# Patient Record
Sex: MALE | Race: BLACK OR AFRICAN AMERICAN | NOT HISPANIC OR LATINO | Employment: STUDENT | URBAN - METROPOLITAN AREA
[De-identification: names, ages, dates, MRNs, and addresses within clinical notes are randomized per-mention and may not be internally consistent; named-entity substitution may affect disease eponyms.]

---
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
## 2023-01-01 ENCOUNTER — OFFICE VISIT (OUTPATIENT)
Dept: PEDIATRIC CARDIOLOGY | Facility: CLINIC | Age: 23
End: 2023-01-01
Attending: PEDIATRICS
Payer: COMMERCIAL

## 2023-01-01 ENCOUNTER — HOSPITAL ENCOUNTER (OUTPATIENT)
Dept: CARDIOLOGY | Facility: CLINIC | Age: 23
Discharge: HOME | End: 2023-07-28
Attending: PEDIATRICS
Payer: COMMERCIAL

## 2023-01-01 ENCOUNTER — TELEPHONE (OUTPATIENT)
Dept: CARDIOLOGY | Facility: CLINIC | Age: 23
End: 2023-01-01
Payer: COMMERCIAL

## 2023-01-01 VITALS
OXYGEN SATURATION: 96 % | DIASTOLIC BLOOD PRESSURE: 70 MMHG | SYSTOLIC BLOOD PRESSURE: 127 MMHG | WEIGHT: 138.67 LBS | HEIGHT: 67 IN | BODY MASS INDEX: 21.76 KG/M2 | RESPIRATION RATE: 16 BRPM | HEART RATE: 62 BPM

## 2023-01-01 DIAGNOSIS — Q24.9 ADULT CONGENITAL HEART DISEASE: ICD-10-CM

## 2023-01-01 DIAGNOSIS — I51.7 LVH (LEFT VENTRICULAR HYPERTROPHY): ICD-10-CM

## 2023-01-01 DIAGNOSIS — I42.2 HYPERTROPHIC CARDIOMYOPATHY (H): ICD-10-CM

## 2023-01-01 DIAGNOSIS — I51.7 LVH (LEFT VENTRICULAR HYPERTROPHY): Primary | ICD-10-CM

## 2023-01-01 DIAGNOSIS — Z82.41 FAMILY HISTORY OF SUDDEN CARDIAC DEATH: Primary | ICD-10-CM

## 2023-01-01 DIAGNOSIS — I42.2 HYPERTROPHIC CARDIOMYOPATHY (H): Primary | ICD-10-CM

## 2023-01-01 DIAGNOSIS — Z82.41 FAMILY HISTORY OF SUDDEN CARDIAC DEATH: ICD-10-CM

## 2023-01-01 DIAGNOSIS — I42.8 OTHER CARDIOMYOPATHY (H): ICD-10-CM

## 2023-01-01 PROCEDURE — G0463 HOSPITAL OUTPT CLINIC VISIT: HCPCS | Mod: 25 | Performed by: PEDIATRICS

## 2023-01-01 PROCEDURE — 93306 TTE W/DOPPLER COMPLETE: CPT | Mod: 26 | Performed by: PEDIATRICS

## 2023-01-01 PROCEDURE — 93303 ECHO TRANSTHORACIC: CPT

## 2023-01-01 PROCEDURE — 93325 DOPPLER ECHO COLOR FLOW MAPG: CPT

## 2023-01-01 PROCEDURE — 99213 OFFICE O/P EST LOW 20 MIN: CPT | Mod: 25 | Performed by: PEDIATRICS

## 2023-01-01 ASSESSMENT — PATIENT HEALTH QUESTIONNAIRE - PHQ9: SUM OF ALL RESPONSES TO PHQ QUESTIONS 1-9: 8

## 2023-03-07 NOTE — OR NURSING
Patient came to ED with c/o pain in the left lower back that goes into her hip and down into her leg. When asking the patient if it was like a sciatic pain, pt states that it is more like kidney stone pain and rates it \"12/20\". Pt states she has a history of kidney stones. Patient refused to wear a mask on admission.  Mother convinced him to wear a mask but patient would not cover his nose - mother stated he could not breathe with mask over nose.  Explained the rationale for masking and expectation that if any staff were in the room, mask must be worn properly.  Patient upset and walked out of preop room.  Nurse manager explained rules/rationale for masking to mother and girlfriend.  Patient and family returned to preop.  Patient would not answer any questions. No eye contact from patient.  Patient on phone thru out interviews.  All questions answered by mother or girlfriend.  Overview of day explained to patient and family.. Will sign consent when MD comes to see preop.

## 2023-05-31 NOTE — TELEPHONE ENCOUNTER
Health Call Center    Phone Message    May a detailed message be left on voicemail: yes     Reason for Call: Other: New Hypertrophic Cardiomyopathy patient, would like to be seen in [place]. Please review and contact mother to schedule.     Action Taken: Other: cardiology    Travel Screening: Not Applicable   Thank you!  [facility]

## 2023-06-01 NOTE — TELEPHONE ENCOUNTER
Date: 6/1/2023    Time of Call: 9:03 AM     Diagnosis: HCM     [ TORB ] Ordering provider: Dr. Milligan  Order: cardiac MRI, zio, labs, ekg, appt     Order received by: TONYA Pierson     Follow-up/additional notes: Overdue referral from peds. Former Dr. Geller pt. Orders placed, sent to scheduling.  Kenna Mccain RN on 6/1/2023 at 9:03 AM

## 2023-06-08 NOTE — TELEPHONE ENCOUNTER
Spoke with mother, patient has ankle swelling and has not been taking his medications per mom. She would schedule with Dr Milligan in October if she is able to see someone else in the mean time sooner. Reaching out to Dr Geller team to see if he can be seen sooner with him and then transfer his care to the adult side.

## 2023-06-08 NOTE — TELEPHONE ENCOUNTER
Called pt mother back in regards to scheduling pt with [facility] with zio, MRI and labs prior. pts mother would pt to be seen sooner that Oct ([facility]ve next available) told pts mom we could sent out zio patch now for pt and if anything comes up urgent we will notify them and get him in sooner, pts mother refused and mentioned wanting to have her son see a different provider.

## 2023-06-08 NOTE — TELEPHONE ENCOUNTER
[facility] Call Center    Phone Message    May a detailed message be left on voicemail: yes     Reason for Call: Other: Please return call to assist mother in scheduling for the pt.      Action Taken: Other: Cardiology    Travel Screening: Not Applicable     Thank you!  Special[facility] Access Center

## 2023-07-28 NOTE — PROGRESS NOTES
"                                              PEDS Cardiac Letter  Date: 2023      Krissy Parmar MD  [address] [id number]      PATIENT: Alexys Medrano  :         2000   BARRON:         2023    Dear Dr Parmar:    Alexys is 23 years old and was seen at the [facility] on 2023. He is seen for hypertrophic cardiomyopathy.  In the past he had dynamic left ventricular outflow obstruction and underwent an extensive myectomy on 2021.  He no longer takes metoprolol.  He says that his exercise tolerance seems normal although his legs become tired when he stands for long period of time.  He is planning to enroll at [employer] to finish his senior year of high school.  He has not experienced any syncope, palpitations or chest pain.    On physical examination his height was 1.705 m (5' 7.13\") and his weight was 62.9 kg (138 lb 10.7 oz). His heart rate was 62 and respirations 16 per minute. The blood pressure in his right arm was 127/70. He was acyanotic, warm and well perfused. He was alert, cooperative, and in no distress. His lungs were clear to auscultation without respiratory distress. He had a regular rhythm with no murmur. The second heart sound was physiologically split with a normal pulmonary component. There was no organomegaly or abdominal tenderness. Peripheral pulses were 2+ and equal in all extremities. There was no clubbing or edema.    Electrocardiogram performed today that I personally reviewed showed sinus rhythm with a mild interventricular conduction abnormality and associated T wave changes.  An echocardiogram performed today that I personally reviewed showed left ventricular hypertrophy with no left ventricular outflow obstruction.  There was trivial mitral regurgitation and no aortic insufficiency.  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    Alexys has " hypertrophic cardiomyopathy with no outflow obstruction.  He has not experienced any palpitations or syncope, and I elected not to do an ECG monitor today.  He should be seen in follow-up in 1 year with an electrocardiogram and echocardiogram, and I gave him the name of Dr. Mason Salvador who sees adults with congenital heart disease.  He did ask for a prescription for medical marijuana because of some nonspecific chest pains that he had been having, and I suggested that he approach his primary physician.    Thank you very much for your confidence in allowing me to participate in Alexys's care. If you have any questions or concerns, please don't hesitate to contact me.    Sincerely,      Marcial Geller M.D.   Pediatric Cardiology   [facility]  Pediatric Specialty Clinic  [phone]    Note: Chart documentation done in part with Dragon Voice Recognition software. Although reviewed after completion, some word and grammatical errors may remain.

## 2023-07-28 NOTE — LETTER
"2023      RE: Alexys Medrano  [address] [facility] [address]     Dear Colleague,    Thank you for the opportunity to participate in the care of your patient, Alexys Medrano, at the [facility] at [facility]. Please see a copy of my visit note below.                                                  PEDS Cardiac Letter  Date: 2023      Krissy Parmar MD  [address] [place] [address]      PATIENT: Alexys Medrano  :         2000   BARRON:         2023    Dear Dr Parmar:    Alexys is 23 years old and was seen at the [facility] on 2023. He is seen for hypertrophic cardiomyopathy.  In the past he had dynamic left ventricular outflow obstruction and underwent an extensive myectomy on 2021.  He no longer takes metoprolol.  He says that his exercise tolerance seems normal although his legs become tired when he stands for long period of time.  He is planning to enroll at the [place] [employer] to finish his senior year of high school.  He has not experienced any syncope, palpitations or chest pain.    On physical examination his height was 1.705 m (5' 7.13\") and his weight was 62.9 kg (138 lb 10.7 oz). His heart rate was 62 and respirations 16 per minute. The blood pressure in his right arm was 127/70. He was acyanotic, warm and well perfused. He was alert, cooperative, and in no distress. His lungs were clear to auscultation without respiratory distress. He had a regular rhythm with no murmur. The second heart sound was physiologically split with a normal pulmonary component. There was no organomegaly or abdominal tenderness. Peripheral pulses were 2+ and equal in all extremities. There was no clubbing or edema.    Electrocardiogram performed today that I personally reviewed showed sinus rhythm with a mild interventricular " conduction abnormality and associated T wave changes.  An echocardiogram performed today that I personally reviewed showed left ventricular hypertrophy with no left ventricular outflow obstruction.  There was trivial mitral regurgitation and no aortic insufficiency.  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    Alexys has hypertrophic cardiomyopathy with no outflow obstruction.  He has not experienced any palpitations or syncope, and I elected not to do an ECG monitor today.  He should be seen in follow-up in 1 year with an electrocardiogram and echocardiogram, and I gave him the name of Dr. Mason Salvador who sees adults with congenital heart disease.  He did ask for a prescription for medical marijuana because of some nonspecific chest pains that he had been having, and I suggested that he approach his primary physician.    Thank you very much for your confidence in allowing me to participate in Alexys's care. If you have any questions or concerns, please don't hesitate to contact me.    Sincerely,      Marcial Geller M.D.   Pediatric Cardiology   [facility]  Pediatric Specialty Clinic  [phone]    Note: Chart documentation done in part with Dragon Voice Recognition software. Although reviewed after completion, some word and grammatical errors may remain.         Please do not hesitate to contact me if you have any questions/concerns.     Sincerely,       Marcial Geller MD

## 2023-07-28 NOTE — PATIENT INSTRUCTIONS
[facility]  [address] [facility] [address] [facility]  [address]  [facility] [id number] [phone]      Cardiology Clinic   RN Care Coordinators: Юлия Jamison, Sharad London or Kendal Viveros  [phone]  Pediatric Cardiology Scheduling  [phone]    Pediatric Call Center/ General Scheduling  [phone]    After Hours and Emergency Contact Number  [phone]  * Ask for the pediatric cardiologist on call         Prescription Renewals  The pharmacy must fax requests to [phone]  * Please allow 3-4 days for prescriptions to be authorized     Imaging Scheduling for Peds Cardiology  [phone]  SHE WILL REACH OUT TO YOU TO SCHEDULE ANY IMAGING NEEDS THAT WERE ORDERED.    Your feedback is very important to us. If you receive a survey about your visit today, please take the time to fill this out so we can continue to improve.

## 2023-07-31 NOTE — PROGRESS NOTES
Date: 7/31/2023    Time of Call: 3:23 PM     Diagnosis:  HCM     [ TORB ] Ordering provider: Madhav Milligan MD  Order: Alexys has hypertrophic cardiomyopathy with no outflow obstruction.  He has not experienced any palpitations or syncope, and I elected not to do an ECG monitor today.  He should be seen in follow-up in 1 year with an electrocardiogram and echocardiogram, and I gave him the name of Dr. Mason Salvador who sees adults with congenital heart disease.      Order received by: Niurka Martinez RN     Follow-up/additional notes: referral from Dr Geller, Dr Salvador does not see HCM in our adult genetics clinic, orders placed for Dr Suárez

## 2023-09-15 LAB
ATRIAL RATE - MUSE: 60 BPM
DIASTOLIC BLOOD PRESSURE - MUSE: NORMAL MMHG
INTERPRETATION ECG - MUSE: NORMAL
P AXIS - MUSE: 42 DEGREES
PR INTERVAL - MUSE: 112 MS
QRS DURATION - MUSE: 116 MS
QT - MUSE: 398 MS
QTC - MUSE: 398 MS
R AXIS - MUSE: 54 DEGREES
SYSTOLIC BLOOD PRESSURE - MUSE: NORMAL MMHG
T AXIS - MUSE: 8 DEGREES
VENTRICULAR RATE- MUSE: 60 BPM

## (undated) DEVICE — SU PROLENE 4-0 RB-1 DA 4X36" M8557

## (undated) DEVICE — ESU ELEC BLADE 2.75" COATED/INSULATED E1455

## (undated) DEVICE — 4FR X 90CM TEMPO ANGIOGRAPHIC CATHETER, PIGTAIL, 0.035IN MAX GUIDEWIRE (EA/1)

## (undated) DEVICE — SU VICRYL 2-0 CT-1 27" UND J259H

## (undated) DEVICE — GLOVE GAMMEX NEOPRENE ULTRA SZ 7 LF 8514

## (undated) DEVICE — BLADE SAW STERNAL 20X30MM KM-32

## (undated) DEVICE — SU SILK 2-0 SH CR 5X18" C0125

## (undated) DEVICE — DRAPE SLUSH/WARMER 66X44" ORS-320

## (undated) DEVICE — DRSG PRIMAPORE 02X3" 7133

## (undated) DEVICE — SU PROLENE 5-0 BBDA 36"  8580H

## (undated) DEVICE — SUCTION MANIFOLD DORNOCH ULTRA CART UL-CL500

## (undated) DEVICE — GOWN IMPERVIOUS 4XL DYNJP2309P

## (undated) DEVICE — CATH ANGIO INFINITI JL3.5 4FRX100CM 538418

## (undated) DEVICE — DRSG TEGADERM 4X10" 1627

## (undated) DEVICE — BLADE KNIFE SURG 15 371115

## (undated) DEVICE — MANIFOLD CUSTOM 2 VALVE H7496021017141

## (undated) DEVICE — SU MONOCRYL 3-0 PS-1 27" Y936H

## (undated) DEVICE — LINEN TOWEL PACK X6 WHITE 5487

## (undated) DEVICE — SU VICRYL 1 CTX 36" J371H

## (undated) DEVICE — SU PLEDGET SOFT TFE 3/8"X3/26"X1/16" PCP40

## (undated) DEVICE — GLIDEWIRE TERUMO RADIOFOCUS .035X260CM ANG EXCHANGE GR3509

## (undated) DEVICE — LINEN DRAPE 54X72" 5467

## (undated) DEVICE — SU SILK 0 CT-1 CR 8X18" C021D

## (undated) DEVICE — SU VICRYL 0 CT-1 CR 8X18" J740D

## (undated) DEVICE — TUBING PRESSURE TRANSDUCER MALE TO FEMALE LL 72" 50P172

## (undated) DEVICE — SU VICRYL 0 CT-1 27" UND J260H

## (undated) DEVICE — INTRODUCER SHEATH 4FRX40CM MICROPUNC PED G47946

## (undated) DEVICE — LINEN TOWEL PACK X30 5481

## (undated) DEVICE — DRSG STERI STRIP 1/2X4" R1547

## (undated) DEVICE — GOWN LG DISP 9515

## (undated) DEVICE — SOL WATER IRRIG 1000ML BOTTLE 2F7114

## (undated) DEVICE — SHEATH INTRODUCER PRELUDE IDEAL 4FR X 11CM  HYDROPHILIC  ANG

## (undated) DEVICE — Device

## (undated) DEVICE — LEAD ELEC MYOCARDIO PACING TEMPORARY 2-0 RB-1 24" TPW10

## (undated) DEVICE — CATH ANGIO INFINITI JR3.5 4FRX100CM 538419

## (undated) DEVICE — MANIFOLD KIT ANGIO AUTOMATED 014613

## (undated) DEVICE — DRAPE IOBAN INCISE 36X23" 6651EZ

## (undated) DEVICE — PACK ADULT HEART UMMC PV15CG92D

## (undated) DEVICE — WIPE PAMPERS PREMOIST CLEANSING BABY SENSITIVE 17116

## (undated) DEVICE — SU PROLENE 3-0 SHDA 48" 8534H

## (undated) DEVICE — CONNECTOR Y 3/8" 369

## (undated) DEVICE — SU PROLENE 4-0 SHDA 36" 8521H

## (undated) DEVICE — SU ETHIBOND 5 V-37 4X30" MB66G

## (undated) DEVICE — SU STEEL 6 CCS 4X18" M654G

## (undated) DEVICE — DRSG TEGADERM 4X4 3/4" 1626W

## (undated) DEVICE — 6FR X 11CM PRELUDE IDEAL HYDROPHILIC SHEATH INTRODUCER

## (undated) DEVICE — DRSG TELFA 3X8" 1238

## (undated) DEVICE — SOL NACL 0.9% IRRIG 1000ML BOTTLE 2F7124

## (undated) DEVICE — ESU PENCIL W/HOLSTER E2350H

## (undated) DEVICE — SYR 10ML LL W/O NDL 302995

## (undated) DEVICE — NDL SPINAL 20GA 3.5" 405182

## (undated) DEVICE — KIT HAND CONTROL ANGIOTOUCH ACIST 65CM AT-P65

## (undated) DEVICE — STRAP KNEE/BODY 31143004

## (undated) DEVICE — SPONGE SURGIFOAM 100 1974

## (undated) DEVICE — SU PROLENE 5-0 C-1DA 36" 8720H

## (undated) DEVICE — PROTECTOR ARM ONE-STEP TRENDELENBURG 40418

## (undated) DEVICE — SU PROLENE 3-0 RB-1DA 36"  8558H

## (undated) DEVICE — SU PROLENE 4-0 BBDA 36" 8581

## (undated) DEVICE — CATH ANGIO WEDGE PRESSURE 6FRX110CM DL AI-07126

## (undated) DEVICE — BLADE KNIFE SURG 10 371110

## (undated) DEVICE — SU SILK 1 TIE 6X30" A307H

## (undated) DEVICE — CLOSURE SYS SKIN PREMIERPRO EXOFINFUSION 4X60CM 3473

## (undated) DEVICE — PACK PEDS LEFT HEART CUSTOM SCV15OHRMH

## (undated) RX ORDER — ACETAMINOPHEN 325 MG/1
TABLET ORAL
Status: DISPENSED
Start: 2020-12-09

## (undated) RX ORDER — LIDOCAINE HYDROCHLORIDE 20 MG/ML
INJECTION, SOLUTION EPIDURAL; INFILTRATION; INTRACAUDAL; PERINEURAL
Status: DISPENSED
Start: 2021-01-20

## (undated) RX ORDER — HEPARIN SODIUM 1000 [USP'U]/ML
INJECTION, SOLUTION INTRAVENOUS; SUBCUTANEOUS
Status: DISPENSED
Start: 2021-01-20

## (undated) RX ORDER — HEPARIN SODIUM 1000 [USP'U]/ML
INJECTION, SOLUTION INTRAVENOUS; SUBCUTANEOUS
Status: DISPENSED
Start: 2020-12-09

## (undated) RX ORDER — LIDOCAINE HYDROCHLORIDE 10 MG/ML
INJECTION, SOLUTION EPIDURAL; INFILTRATION; INTRACAUDAL; PERINEURAL
Status: DISPENSED
Start: 2020-12-09

## (undated) RX ORDER — FENTANYL CITRATE-0.9 % NACL/PF 10 MCG/ML
PLASTIC BAG, INJECTION (ML) INTRAVENOUS
Status: DISPENSED
Start: 2021-01-20

## (undated) RX ORDER — BUPIVACAINE HYDROCHLORIDE 2.5 MG/ML
INJECTION, SOLUTION EPIDURAL; INFILTRATION; INTRACAUDAL
Status: DISPENSED
Start: 2020-12-09

## (undated) RX ORDER — PROPOFOL 10 MG/ML
INJECTION, EMULSION INTRAVENOUS
Status: DISPENSED
Start: 2021-01-20

## (undated) RX ORDER — FENTANYL CITRATE 50 UG/ML
INJECTION, SOLUTION INTRAMUSCULAR; INTRAVENOUS
Status: DISPENSED
Start: 2021-01-20

## (undated) RX ORDER — CEFAZOLIN SODIUM 1 G/3ML
INJECTION, POWDER, FOR SOLUTION INTRAMUSCULAR; INTRAVENOUS
Status: DISPENSED
Start: 2021-01-20

## (undated) RX ORDER — ONDANSETRON 2 MG/ML
INJECTION INTRAMUSCULAR; INTRAVENOUS
Status: DISPENSED
Start: 2021-01-20

## (undated) RX ORDER — FENTANYL CITRATE 50 UG/ML
INJECTION, SOLUTION INTRAMUSCULAR; INTRAVENOUS
Status: DISPENSED
Start: 2020-12-09

## (undated) RX ORDER — NITROGLYCERIN 5 MG/ML
VIAL (ML) INTRAVENOUS
Status: DISPENSED
Start: 2020-12-09

## (undated) RX ORDER — PROTAMINE SULFATE 10 MG/ML
INJECTION, SOLUTION INTRAVENOUS
Status: DISPENSED
Start: 2021-01-20

## (undated) RX ORDER — IODIXANOL 320 MG/ML
INJECTION, SOLUTION INTRAVASCULAR
Status: DISPENSED
Start: 2020-12-09

## (undated) RX ORDER — HYDROMORPHONE HYDROCHLORIDE 1 MG/ML
INJECTION, SOLUTION INTRAMUSCULAR; INTRAVENOUS; SUBCUTANEOUS
Status: DISPENSED
Start: 2021-01-20